# Patient Record
Sex: MALE | Race: WHITE | NOT HISPANIC OR LATINO | Employment: FULL TIME | URBAN - METROPOLITAN AREA
[De-identification: names, ages, dates, MRNs, and addresses within clinical notes are randomized per-mention and may not be internally consistent; named-entity substitution may affect disease eponyms.]

---

## 2023-10-29 ENCOUNTER — APPOINTMENT (OUTPATIENT)
Dept: RADIOLOGY | Facility: IMAGING CENTER | Age: 33
End: 2023-10-29
Attending: STUDENT IN AN ORGANIZED HEALTH CARE EDUCATION/TRAINING PROGRAM

## 2023-10-29 ENCOUNTER — OFFICE VISIT (OUTPATIENT)
Dept: URGENT CARE | Facility: CLINIC | Age: 33
End: 2023-10-29

## 2023-10-29 VITALS
SYSTOLIC BLOOD PRESSURE: 112 MMHG | OXYGEN SATURATION: 97 % | HEART RATE: 75 BPM | DIASTOLIC BLOOD PRESSURE: 68 MMHG | HEIGHT: 70 IN | WEIGHT: 179 LBS | BODY MASS INDEX: 25.62 KG/M2 | TEMPERATURE: 98 F

## 2023-10-29 DIAGNOSIS — S69.91XA HAND INJURY, RIGHT, INITIAL ENCOUNTER: ICD-10-CM

## 2023-10-29 DIAGNOSIS — S63.601A SPRAIN OF RIGHT THUMB, INITIAL ENCOUNTER: ICD-10-CM

## 2023-10-29 PROCEDURE — 99203 OFFICE O/P NEW LOW 30 MIN: CPT | Performed by: STUDENT IN AN ORGANIZED HEALTH CARE EDUCATION/TRAINING PROGRAM

## 2023-10-29 PROCEDURE — 73130 X-RAY EXAM OF HAND: CPT | Mod: TC,RT | Performed by: NURSE PRACTITIONER

## 2023-10-29 PROCEDURE — 3078F DIAST BP <80 MM HG: CPT | Performed by: STUDENT IN AN ORGANIZED HEALTH CARE EDUCATION/TRAINING PROGRAM

## 2023-10-29 PROCEDURE — 3074F SYST BP LT 130 MM HG: CPT | Performed by: STUDENT IN AN ORGANIZED HEALTH CARE EDUCATION/TRAINING PROGRAM

## 2023-10-29 RX ORDER — FAMOTIDINE 20 MG/1
20 TABLET, FILM COATED ORAL
COMMUNITY

## 2023-10-30 NOTE — PROGRESS NOTES
"Subjective:   Christophe Pedroza is a 33 y.o. male who presents for Hand Injury (Patient got hurt today when roller skating. Tried to catch his fall and he hurt his right thumb. )      HPI:  33-year-old male presents the urgent care after sustaining a ground-level fall 1 day ago while rollerskating.  States that he fell to his right side with an outstretched arm and caught himself with his hand.  He has pain to the anterior aspect of his thumb.  Reports full range of motion but pain with gripping and heavy lifting.  No numbness, tingling, burning, radiation pain up the arm.    Medications:    famotidine Tabs  TAMSULOSIN HCL PO    Allergies: Penicillins    Problem List: Christophe Pedroza does not have a problem list on file.    Surgical History:  No past surgical history on file.    Past Social Hx: Christophe Pedroza       Past Family Hx:  Christophe Pedroza family history is not on file.     Problem list, medications, and allergies reviewed by myself today in Epic.     Objective:     /68 (BP Location: Left arm, Patient Position: Sitting, BP Cuff Size: Adult)   Pulse 75   Temp 36.7 °C (98 °F) (Temporal)   Ht 1.778 m (5' 10\")   Wt 81.2 kg (179 lb)   SpO2 97%   BMI 25.68 kg/m²     Physical Exam  Vitals reviewed.   Constitutional:       General: He is not in acute distress.     Appearance: Normal appearance.   HENT:      Head: Normocephalic.      Right Ear: Tympanic membrane, ear canal and external ear normal.      Left Ear: Tympanic membrane, ear canal and external ear normal.      Nose: Nose normal.      Mouth/Throat:      Mouth: Mucous membranes are moist.   Eyes:      Conjunctiva/sclera: Conjunctivae normal.      Pupils: Pupils are equal, round, and reactive to light.   Cardiovascular:      Rate and Rhythm: Normal rate and regular rhythm.      Pulses: Normal pulses.      Heart sounds: Normal heart sounds. No murmur heard.  Pulmonary:      Effort: Pulmonary effort is normal. No respiratory distress.      " Breath sounds: Normal breath sounds. No stridor. No wheezing, rhonchi or rales.   Musculoskeletal:      Cervical back: Normal range of motion.      Comments: Right hand: Tenderness to the palmar aspect of the CMC joint.  Thumb able to oppose all fingers.  5 out of 5 strength during elbow flexion, abduction, and extension.  No deformity.  No ecchymosis or swelling.  Cap refill less than 2 seconds and 2+ radial pulse.   Lymphadenopathy:      Cervical: No cervical adenopathy.   Skin:     General: Skin is warm and dry.      Capillary Refill: Capillary refill takes less than 2 seconds.      Findings: No erythema, lesion or rash.   Neurological:      General: No focal deficit present.      Mental Status: He is alert and oriented to person, place, and time.         RADIOLOGY RESULTS   DX-HAND 3+ RIGHT    Result Date: 10/29/2023  10/29/2023 8:16 PM HISTORY/REASON FOR EXAM: Pain/Deformity Following Trauma; Patient had a ground-level fall while rollerblading 24 hours ago where he landed on an outstretched hand.  Pain to the right CMC joint.  No obvious deformity.  Good strength and range of motion. TECHNIQUE/EXAM DESCRIPTION:  AP, lateral, and oblique views of the RIGHT hand. COMPARISON:  None FINDINGS: The bony structures and articulations are within normal limits.  No fracture, subluxation, or dislocation is appreciated.     1.  No acute traumatic bony injury.           Assessment/Plan:     Diagnosis and associated orders:     1. Sprain of right thumb, initial encounter  DX-HAND 3+ RIGHT         Comments/MDM:     X-ray shows no acute traumatic bony injury.  No signs of fracture.  Patient's presentation physical exam is consistent with sprain of the thumb.  Most likely the CMC joint.  He does have a Velcro brace that he may continue to use.  Ibuprofen/Tylenol as needed.  Discussed early range of motion and stretching.  Ice 3-5 times daily for 20 minutes at a time.  After the first 3 days transition to heat as well.   Discussed typical timeframe for resolution of symptoms.  ED/return precautions given.         Differential diagnosis, natural history, supportive care, and indications for immediate follow-up discussed.    Advised the patient to follow-up with the primary care physician for recheck, reevaluation, and consideration of further management.    Please note that this dictation was created using voice recognition software. I have made a reasonable attempt to correct obvious errors, but I expect that there are errors of grammar and possibly content that I did not discover before finalizing the note.    Electronically signed by George Cummings PA-C.